# Patient Record
(demographics unavailable — no encounter records)

---

## 2024-11-26 NOTE — DISCUSSION/SUMMARY
[FreeTextEntry1] : Anticipatory guidance and parent education given. Symptoms likely due to viral URI. May use Tylenol or Ibuprofen as needed for fever or discomfort. Recommend supportive care including humidified air, increased fluids, rest, and nasal saline followed by nasal suction. Return if symptoms worsen or persist.  Return in 48-72 hours if fever persists.

## 2024-11-26 NOTE — HISTORY OF PRESENT ILLNESS
[de-identified] : cough, fever [FreeTextEntry6] : 3y11m old girl BIB mother with c/o increased cough, congestion and fever to 101.8 since yesterday. Pt s/p acute URi 2 weeks ago that was improving until yesterday. Pt is in school. No SOB, difficulty breathing, chest pain, stridor or wheeze. No n/v/d. No headache, abdominal pain, sore throat or rash. No body aches or fatigue. No urinary complaints. Good po/uop/bm. Normal sleep and activity.

## 2024-11-26 NOTE — REVIEW OF SYSTEMS
[Fever] : fever [Chills] : no chills [Malaise] : no malaise [Headache] : no headache [Eye Discharge] : no eye discharge [Eye Redness] : no eye redness [Ear Pain] : no ear pain [Nasal Discharge] : nasal discharge [Nasal Congestion] : nasal congestion [Sore Throat] : no sore throat [Tachypnea] : not tachypneic [Wheezing] : no wheezing [Cough] : cough [Shortness of Breath] : no shortness of breath [Negative] : Genitourinary

## 2025-01-03 NOTE — HISTORY OF PRESENT ILLNESS
[Mother] : mother [Vitamin] : Patient takes vitamin daily [Normal] : Normal [Brushing teeth] : Brushing teeth [Yes] : Patient goes to dentist yearly [Toothpaste] : Primary Fluoride Source: Toothpaste [Up to date] : Up to date [de-identified] : varied foods [FreeTextEntry9] : in nursery. Plans kind '26 [FreeTextEntry1] :  -s/p card consult  dx: innocent murmur -foll by AI. Doing OIT for food allergy -saw derm re: AD

## 2025-01-03 NOTE — HISTORY OF PRESENT ILLNESS
[Mother] : mother [Vitamin] : Patient takes vitamin daily [Normal] : Normal [Brushing teeth] : Brushing teeth [Yes] : Patient goes to dentist yearly [Toothpaste] : Primary Fluoride Source: Toothpaste [Up to date] : Up to date [de-identified] : varied foods [FreeTextEntry9] : in nursery. Plans kind '26 [FreeTextEntry1] :  -s/p card consult  dx: innocent murmur -foll by AI. Doing OIT for food allergy -saw derm re: AD

## 2025-01-03 NOTE — PHYSICAL EXAM

## 2025-01-16 NOTE — REASON FOR VISIT
[Routine Follow-Up] : a routine follow-up visit for [Anaphylaxis] : anaphylaxis [To Food] : allergy to food

## 2025-01-16 NOTE — ASSESSMENT
[FreeTextEntry1] : 4 yr old with history of known reaction cashew and walnut - did well with OIT with Dr. Lau - dual therapy wtih cashew and walnut but recently had significant anaphylaxis with dosing  Mom did discus this reaction with Dr. Lau office but was unhappy with nature of reaction and  wants no risk of anaphylaxis and is going to stop OIT  Discuss with mom implications of stopping OIT - likely resensitizing and nature of Immuincaps Discuss use of Epi Pen Discuss current use of Xolair for food allergies Discuss potential future treatments for food allergies Will repeat all Immunocaps in 6-12 months  Total MD time spent on this encounter was 35 minutes.  This includes time devoted to preparing to see the patient with review of previous medical record, obtaining medical history, performing physical exam, counseling and patient education with patient and family, ordering medications and lab studies, documentation in the medical record and coordination of care.

## 2025-01-16 NOTE — HISTORY OF PRESENT ILLNESS
[de-identified] : 4y old  - last seen 9/23 with known reaction to cashew - - mild brandy oral hives. -   OK with PN, almond, and hazelnut  she has been evalauted on several occasions -   ST8/23 showed 8-10mm cashew, walnut and pistachio and pt told to avoid Confirmatory Immunocaps as follows Cashew - 4.34 with pos cpn - avoid Pistachio - 5.82 - avoid [place] 8.82 with pos cpn - avoid  she had second opinion with Dr. Barrow 3/24 and found to have positive ST to cashew, pistachio and walnut and at that visit was told to avoid those TN - Pecan challenge was suggest but mom did not do  Mom was interested in OIT and saw Dr. Lau in 4/24 and started OIT with cashew and walnut - she did well in build up period and mom states she reached "bit tolerance" - however earlier this week she was given standard non incremental dose and within few min was noted to have intense vomiting, hives and vocal changes with stridor - mom gave Auvi Q x2 and 911 called. By the time they arrived child was better.  Mom now very concerned about this reaction and will likely stop OIT secondary to its risks of systemic allergic reactions. She has a variety of questions regarding this decision.

## 2025-05-20 NOTE — PHYSICAL EXAM
[Alert] : alert [No Acute Distress] : no acute distress [Normal Voice/Communication] : normal voice communication [Supple] : the neck was supple [Normal S1, S2] : normal S1 and S2 [Regular Rhythm] : with a regular rhythm [Soft] : abdomen soft [Normal Cervical Lymph Nodes] : cervical [Skin Intact] : skin intact  [No clubbing] : no clubbing [No Cyanosis] : no cyanosis [Alert, Awake, Oriented as Age-Appropriate] : alert, awake, oriented as age appropriate [de-identified] : Eyes clear. [de-identified] : Throat clear.  Nasal mucosa pink, mild stuffiness/white mucus in the left nostril.  Tympanic membranes with some wax. [de-identified] : Chest clear, good air entry, no wheezing or crackles. [de-identified] : Small patch of dryness/excoriation on the left lower leg.

## 2025-05-20 NOTE — ASSESSMENT
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  Continue avoidance of walnut/pecan, cashew/pistachio.  Forward results of blood work to our office.  Continue to carry Auvi-Q.  In the future, consider Xolair, sublingual immunotherapy, or OIT with 1 nut at a time. Eczema: Continue hypoallergenic products, skin moisturizer. Follow-up yearly.

## 2025-05-20 NOTE — REASON FOR VISIT
[Routine Follow-Up] : a routine follow-up visit for [Anaphylaxis] : anaphylaxis [To Food] : allergy to food [Mother] : mother

## 2025-05-20 NOTE — SOCIAL HISTORY
[de-identified] : House with no carpet in the bedroom, 1 dog, no cigarette smoke exposure, central air conditioning, radiator and baseboard heating.

## 2025-05-20 NOTE — HISTORY OF PRESENT ILLNESS
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  Transfer of care from Dr. Lopez, who retired. Symptoms onset between 1/2 years of age and when her mother ate a cashew granola and gave the patient 1 bite.  She developed urticaria on the face but no other symptoms.  By the time she was brought to the pediatrician's office, the urticaria cleared spontaneously.  Subsequent testing showed allergy to cashew, pistachio and walnut.  She had skin testing and blood work.  She was advised to avoid those tree nuts and possibly perform an oral challenge to pecan.  Mother decided to proceed with oral immunotherapy with Dr. Lau.  She started OIT with cashew and walnut.  She had occasional complaints of mouth hurting and 1 or 2 perioral urticaria during the buildup phase, but she was able to successfully eat, 1 walnut into cashews daily, ground in applesauce.  She reached maintenance in September.  Blood work performed by Dr. Lau in January showed decrease in IgE to cashew and pistachio but increased in IgE to walnut and low IgE to pecan. In January after blood work was drawn, 1 day she had her routine dose of ground nuts in applesauce.  After 1 bite, she had projectile vomiting, her face swelled, she started crying with a different pitch.  Mother administered 2 Auvi-Q's, Benadryl 7 mL, albuterol 2 puffs and called the ambulance.  There was nothing unusual about that dose.  She was not sick, she was not running around, she ate a bowl of cereal with milk and banana before dose of OIT. After the above reaction, OIT was discontinued. Family has Auvi-Q at hand and emergency plan. Tolerates peanut, almond and sometimes Nutella. Mother is concerned that patient has occasional regurgitation every few weeks, usually 2 hours after going to bed, associated with night terrors.  She question possibility of eosinophilic esophagitis as a complication of OIT. No history of seasonal allergies, no history of asthma, no frequent infections.  She was given albuterol as routine treatment for allergic reactions during OIT. She had eczema in infancy that was quite severe, which she mostly outgrew.  She gets occasional patch of dry skin managed with Vaseline. She used to get hives from contact with her dog saliva, which she does not get anymore.  Blood work showed IgE to dog.  She has no respiratory symptoms with dog exposure.

## 2025-05-27 NOTE — PHYSICAL EXAM
[Clear Rhinorrhea] : clear rhinorrhea [NL] : warm, clear [Wheezing] : no wheezing [Tachypnea] : no tachypnea

## 2025-05-27 NOTE — DISCUSSION/SUMMARY
[FreeTextEntry1] : Cough possible croup based on parent history. Pt in no distress. Discussed with parent croup including expected natural course. Reviewed stridor and recommendation to expose child to cool air. If stridor persistent, seek care urgently. Also seek care if with increased work of breathing, rate of breathing, or other concerns.    Supportive care: cool mist humidifier, honey for cough suppression, warm liquids to break up mucus  Nasal saline for congestion Elevate head of bed Appropriate anticipatory guidance and education given; seek care if symptoms persist or worsen, signs of distress (reviewed w/parent signs of resp distress), development of fever

## 2025-05-27 NOTE — HISTORY OF PRESENT ILLNESS
[FreeTextEntry6] : 5 y/o with mother for concerns of cough. Cough started two days ago. Afebrile. Denies sore throat, headache, or abdominal pain. Cough per parent is "croupy." Denies increased work of breathing, no stridor or wheeze noted. No hx of albuterol use but sibling with RAD. Tolerating PO, normal BMs, good UOP. Good activity level in office.